# Patient Record
Sex: FEMALE
[De-identification: names, ages, dates, MRNs, and addresses within clinical notes are randomized per-mention and may not be internally consistent; named-entity substitution may affect disease eponyms.]

---

## 2024-07-29 PROBLEM — Z00.00 ENCOUNTER FOR PREVENTIVE HEALTH EXAMINATION: Status: ACTIVE | Noted: 2024-07-29

## 2024-08-01 ENCOUNTER — TRANSCRIPTION ENCOUNTER (OUTPATIENT)
Age: 29
End: 2024-08-01

## 2024-08-01 ENCOUNTER — APPOINTMENT (OUTPATIENT)
Dept: VASCULAR SURGERY | Facility: CLINIC | Age: 29
End: 2024-08-01
Payer: COMMERCIAL

## 2024-08-01 VITALS
OXYGEN SATURATION: 96 % | BODY MASS INDEX: 25.71 KG/M2 | HEIGHT: 66 IN | WEIGHT: 160 LBS | SYSTOLIC BLOOD PRESSURE: 127 MMHG | HEART RATE: 82 BPM | DIASTOLIC BLOOD PRESSURE: 89 MMHG

## 2024-08-01 DIAGNOSIS — Z87.39 PERSONAL HISTORY OF OTHER DISEASES OF THE MUSCULOSKELETAL SYSTEM AND CONNECTIVE TISSUE: ICD-10-CM

## 2024-08-01 DIAGNOSIS — I83.819 VARICOSE VEINS OF UNSPECIFIED LOWER EXTREMITY WITH PAIN: ICD-10-CM

## 2024-08-01 DIAGNOSIS — Z78.9 OTHER SPECIFIED HEALTH STATUS: ICD-10-CM

## 2024-08-01 DIAGNOSIS — I87.2 VENOUS INSUFFICIENCY (CHRONIC) (PERIPHERAL): ICD-10-CM

## 2024-08-01 PROCEDURE — 93971 EXTREMITY STUDY: CPT | Mod: LT

## 2024-08-01 PROCEDURE — 99204 OFFICE O/P NEW MOD 45 MIN: CPT

## 2024-08-01 RX ORDER — TIRZEPATIDE 10 MG/.5ML
10 INJECTION, SOLUTION SUBCUTANEOUS
Refills: 0 | Status: ACTIVE | COMMUNITY

## 2024-08-01 NOTE — HISTORY OF PRESENT ILLNESS
[FreeTextEntry1] : 29 year female with no significant past medical history presents for consultation for a bulging and painful varicose vein on her left lower extremity. Patient states the varicosity has gotten significantly worse since her second pregnancy. She endorses associated symptoms of leg heaviness, fatigue and swelling that worsen with her menstrual cycle and with prolonged dependency. Also complains of heaviness and aching in her pelvis during her menstrual cycle. Denies dyspareunia or vulvar varicosities.

## 2024-08-01 NOTE — ASSESSMENT
[FreeTextEntry1] : This is a 29 year old with persistent RLE lower extremity venous insufficiency, CEAP classification C-3 which is unresponsive to at least 3 months of compression stockings 20-30 mmHg, leg elevation, exercise and over the counter pain medication (Ibuprofen). Patient has complaints of worsening left leg discomfort with swelling, fatigue, heaviness, achiness, cramping, and painful varicosities. The patient's symptoms interfere with their ADLs, such as their ability to work and exercise. Patient has intact pulses in both legs without evidence of arterial insufficiency. Treatment is indicated for symptomatic venous reflux disease with symptoms which is refractory to conservative therapy. Venous duplex study demonstrates L GSV lower extremity venous insufficiency. The need for definitive effective treatment is based on severe, interfering and worsening reflux symptoms with evidence of venous insufficiency on venous ultrasound.  Patient is a candidate for L GSV RFA.  The risks, benefits and alternatives treatment versus continued conservative management were discussed with the patient.  Treatment plan to be scheduled.

## 2024-08-01 NOTE — PHYSICAL EXAM
[2+] : left 2+ [Ankle Swelling (On Exam)] : present [Varicose Veins Of Lower Extremities] : present [Varicose Veins Of The Left Leg] : of the left leg [Ankle Swelling On The Left] : moderate [No Rash or Lesion] : No rash or lesion [Alert] : alert [Oriented to Person] : oriented to person [Oriented to Place] : oriented to place [Oriented to Time] : oriented to time [] : not present [de-identified] : alert; ambulates without assistance  [de-identified] : WNL [FreeTextEntry1] : Large, darkened bulging varicosities left medial thigh and left calf B/L LE warm and well perfused

## 2024-09-11 ENCOUNTER — APPOINTMENT (OUTPATIENT)
Dept: VASCULAR SURGERY | Facility: CLINIC | Age: 29
End: 2024-09-11
Payer: COMMERCIAL

## 2024-09-11 VITALS
OXYGEN SATURATION: 98 % | WEIGHT: 160 LBS | HEIGHT: 66 IN | DIASTOLIC BLOOD PRESSURE: 85 MMHG | SYSTOLIC BLOOD PRESSURE: 130 MMHG | HEART RATE: 80 BPM | BODY MASS INDEX: 25.71 KG/M2

## 2024-09-11 PROCEDURE — 36475 ENDOVENOUS RF 1ST VEIN: CPT | Mod: LT

## 2024-09-11 NOTE — PROCEDURE
[FreeTextEntry1] : Left GSV RFA [FreeTextEntry2] : Chronic Venous Insufficiency, Varicose veins with pain [FreeTextEntry3] : Preoperative Diagnosis: Varicose veins, symptomatic, chronic venous insufficiency (I87.2)  Postoperative Diagnosis: Same      Attending: Phillip Fernández MD      Assistant: JAI Liu    				      Procedure:      1.	Endovenous Radiofrequency Ablation of the  Left GSV (75634)              Anesthesia:  Local/Tumescent (50cc 1% Lidocaine in 500cc NS)  Complications: none        Description: Chart was reviewed, including ultrasound report and operative plan. Consent was obtained from the patient, risks and benefits of the procedure were explained. Prior to the start of the procedure, the patient was examined in the standing position. The skin was marked to identify superficial varicosities.  The patient was then placed on the procedure table and the entire lower extremity was prepped and a sterile field using barriers was created.   Time out was performed.             1. Using ultrasound guidance a 19-guage needle was placed directly into the saphenous vein. The guide wire was then placed and the needle removed.  A 7F sheath for the RFA catheter was then introduced along the guide wire.  The RFA catheter was then introduced through the sheath.  The catheter tip position was then confirmed at approximately 2 cm from the junction.  The perivenous tissue of the saphenous vein was then anesthetized using Tumescent anesthesia,  100cc of 0.1% lidocaine solution. The catheter placement was again confirmed using ultrasound 2cm from the saphenous junction. Segmental ablation of the saphenous was performed with radiofrequency energy using 120 degrees Celsius for 20 sec each segment (each segment was treated twice). The catheter was removed and local pressure applied.  Ultrasound was again carried out, which demonstrated significant venospasm of the saphenous vein and no evidence of trauma to the deep veins.                   The incisions and injection sites were covered with 4x4 gauze and abdominal pads and the extremity was wrapped with kerlix and Coban using standard technique.  This was followed by application of a 20-30 mm Hg graduated venous compression hose.                The patient was given oral and written instructions for aftercare, which included frequent ambulation and leg elevation.  The patient was instructed to take NSAIDs.  The patient was scheduled for a follow-up visit in approximately 5-7 days with a venous duplex ultrasound of the treated leg to evaluate for success of procedure and evaluate for any deep vein thrombosis. The patient tolerated the procedure well and left the office in excellent condition ambulating without difficulty.

## 2024-09-16 ENCOUNTER — APPOINTMENT (OUTPATIENT)
Dept: VASCULAR SURGERY | Facility: CLINIC | Age: 29
End: 2024-09-16
Payer: COMMERCIAL

## 2024-09-16 PROCEDURE — 37766 PHLEB VEINS - EXTREM 20+: CPT | Mod: LT

## 2024-09-16 PROCEDURE — 36471 NJX SCLRSNT MLT INCMPTNT VN: CPT | Mod: LT

## 2024-09-16 PROCEDURE — 93971 EXTREMITY STUDY: CPT | Mod: RT

## 2024-09-16 PROCEDURE — 76942 ECHO GUIDE FOR BIOPSY: CPT

## 2024-09-16 NOTE — PROCEDURE
[FreeTextEntry1] : L SAP UGS [FreeTextEntry2] : CVI [FreeTextEntry3] : Preoperative Diagnosis: Varicose veins, symptomatic, chronic venous insufficiency (I87.2).    Postoperative Diagnosis: Same  Attending:  Phillip Fernández MD Assistant: NIK Neal PA-C Procedure:  1. left leg, Ultrasound guided sclerotherapy, multiple veins (31757)  2. left Leg Ambulatory Phlebectomy x 22 ([id number])   Anesthesia: x Local/Tumescent (50cc 1% Lidocaine in 500cc NS) _ MAC  Complications: none  Description: Chart was reviewed, including ultrasound report and operative plan. Consent was obtained from the patient, risks and benefits of the procedure were explained. Prior to the start of the procedure, the patient was examined in the standing position. The skin was marked to identify superficial varicosities.  The patient was then placed on the procedure table and the entire lower extremity was prepped and a sterile field using barriers was created.  Time out was performed.   1. The deeper varicosities in the left leg were treated with ultrasound-guided sclerotherapy. Ultrasound was used to identify the extent and distribution of the deeper varicosities. Multiple varicosities were accessed with a 25G needle and injected with 1% STS. Compression of the veins using ultrasound was performed.  Patient was kept in Trendelenburg position for 10 minutes and instructed to perform dorsi/plantar flexion of the foot.    2. The area of the marked surface varicosities was anesthetized using tumescent anesthesia, 100 cc of 0.1% lidocaine solution. The marked varicose veins were then extracted using a micro-incisional avulsion technique and 22 separate stab incisions were made with a 16G Nokor Needle along the course of the varicosities.  The diseased vein segments were removed using phlebectomy hooks and mosquitoes.  Hemostasis was obtained with compression and wash-out of the subcutaneous space.  The incisions and injection sites were covered with 4x4 gauze and abdominal pads and the extremity was wrapped with kerlix and Coban using standard technique.  This was followed by application of a 20-30 mm Hg graduated venous compression hose.    The patient was given oral and written instructions for aftercare, which included frequent ambulation and leg elevation.  The patient was instructed to take NSAIDs.  The patient was scheduled for a follow-up visit in approximately 5-7 days with a venous duplex ultrasound of the treated leg to evaluate for success of procedure and evaluate for any deep vein thrombosis. The patient tolerated the procedure well and left the office in excellent condition ambulating without difficulty.

## 2024-09-16 NOTE — PROCEDURE
[FreeTextEntry1] : L SAP UGS [FreeTextEntry2] : CVI [FreeTextEntry3] : Preoperative Diagnosis: Varicose veins, symptomatic, chronic venous insufficiency (I87.2).    Postoperative Diagnosis: Same  Attending:  Phillip Fernández MD Assistant: NIK Neal PA-C Procedure:  1. left leg, Ultrasound guided sclerotherapy, multiple veins ([id number])  2. left Leg Ambulatory Phlebectomy x 22 ([id number])   Anesthesia: x Local/Tumescent (50cc 1% Lidocaine in 500cc NS) _ MAC  Complications: none  Description: Chart was reviewed, including ultrasound report and operative plan. Consent was obtained from the patient, risks and benefits of the procedure were explained. Prior to the start of the procedure, the patient was examined in the standing position. The skin was marked to identify superficial varicosities.  The patient was then placed on the procedure table and the entire lower extremity was prepped and a sterile field using barriers was created.  Time out was performed.   1. The deeper varicosities in the left leg were treated with ultrasound-guided sclerotherapy. Ultrasound was used to identify the extent and distribution of the deeper varicosities. Multiple varicosities were accessed with a 25G needle and injected with 1% STS. Compression of the veins using ultrasound was performed.  Patient was kept in Trendelenburg position for 10 minutes and instructed to perform dorsi/plantar flexion of the foot.    2. The area of the marked surface varicosities was anesthetized using tumescent anesthesia, 100 cc of 0.1% lidocaine solution. The marked varicose veins were then extracted using a micro-incisional avulsion technique and 22 separate stab incisions were made with a 16G Nokor Needle along the course of the varicosities.  The diseased vein segments were removed using phlebectomy hooks and mosquitoes.  Hemostasis was obtained with compression and wash-out of the subcutaneous space.  The incisions and injection sites were covered with 4x4 gauze and abdominal pads and the extremity was wrapped with kerlix and Coban using standard technique.  This was followed by application of a 20-30 mm Hg graduated venous compression hose.    The patient was given oral and written instructions for aftercare, which included frequent ambulation and leg elevation.  The patient was instructed to take NSAIDs.  The patient was scheduled for a follow-up visit in approximately 5-7 days with a venous duplex ultrasound of the treated leg to evaluate for success of procedure and evaluate for any deep vein thrombosis. The patient tolerated the procedure well and left the office in excellent condition ambulating without difficulty.

## 2024-09-23 ENCOUNTER — APPOINTMENT (OUTPATIENT)
Dept: VASCULAR SURGERY | Facility: CLINIC | Age: 29
End: 2024-09-23
Payer: COMMERCIAL

## 2024-09-23 DIAGNOSIS — I87.2 VENOUS INSUFFICIENCY (CHRONIC) (PERIPHERAL): ICD-10-CM

## 2024-09-23 PROCEDURE — 93971 EXTREMITY STUDY: CPT | Mod: LT

## 2024-09-24 NOTE — PHYSICAL EXAM
[TextEntry] : alert and oriented in NAD LLE warm and well perfused + distal pulses SAP sites healing well,  minimal ecchymosis

## 2024-10-29 ENCOUNTER — NON-APPOINTMENT (OUTPATIENT)
Age: 29
End: 2024-10-29

## 2025-05-22 ENCOUNTER — APPOINTMENT (OUTPATIENT)
Dept: OBGYN | Facility: CLINIC | Age: 30
End: 2025-05-22
Payer: COMMERCIAL

## 2025-05-22 ENCOUNTER — NON-APPOINTMENT (OUTPATIENT)
Age: 30
End: 2025-05-22

## 2025-05-22 VITALS
WEIGHT: 150 LBS | DIASTOLIC BLOOD PRESSURE: 70 MMHG | HEIGHT: 66 IN | SYSTOLIC BLOOD PRESSURE: 110 MMHG | BODY MASS INDEX: 24.11 KG/M2

## 2025-05-22 DIAGNOSIS — Z78.9 OTHER SPECIFIED HEALTH STATUS: ICD-10-CM

## 2025-05-22 DIAGNOSIS — R30.0 DYSURIA: ICD-10-CM

## 2025-05-22 DIAGNOSIS — Z87.39 PERSONAL HISTORY OF OTHER DISEASES OF THE MUSCULOSKELETAL SYSTEM AND CONNECTIVE TISSUE: ICD-10-CM

## 2025-05-22 DIAGNOSIS — Z01.419 ENCOUNTER FOR GYNECOLOGICAL EXAMINATION (GENERAL) (ROUTINE) W/OUT ABNORMAL FINDINGS: ICD-10-CM

## 2025-05-22 LAB
APPEARANCE: CLEAR
BILIRUBIN URINE: NEGATIVE
BLOOD URINE: NEGATIVE
COLOR: YELLOW
GLUCOSE QUALITATIVE U: NEGATIVE
KETONES URINE: NEGATIVE
LEUKOCYTE ESTERASE URINE: ABNORMAL
NITRITE URINE: NEGATIVE
PH URINE: 5.5
PROTEIN URINE: NEGATIVE
SPECIFIC GRAVITY URINE: 1.02
UROBILINOGEN URINE: 0.2 (ref 0.2–?)

## 2025-05-22 PROCEDURE — 99385 PREV VISIT NEW AGE 18-39: CPT

## 2025-05-22 RX ORDER — COPPER 313.4 MG/1
INTRAUTERINE DEVICE INTRAUTERINE
Refills: 0 | Status: ACTIVE | COMMUNITY

## 2025-05-22 RX ORDER — NITROFURANTOIN (MONOHYDRATE/MACROCRYSTALS) 25; 75 MG/1; MG/1
100 CAPSULE ORAL
Qty: 30 | Refills: 2 | Status: ACTIVE | COMMUNITY
Start: 2025-05-22 | End: 1900-01-01

## 2025-05-25 LAB — HPV HIGH+LOW RISK DNA PNL CVX: NOT DETECTED
